# Patient Record
Sex: MALE | Race: WHITE | NOT HISPANIC OR LATINO | Employment: FULL TIME | URBAN - METROPOLITAN AREA
[De-identification: names, ages, dates, MRNs, and addresses within clinical notes are randomized per-mention and may not be internally consistent; named-entity substitution may affect disease eponyms.]

---

## 2019-07-25 ENCOUNTER — TRANSFERRED RECORDS (OUTPATIENT)
Dept: HEALTH INFORMATION MANAGEMENT | Facility: CLINIC | Age: 44
End: 2019-07-25

## 2021-04-20 ENCOUNTER — TRANSFERRED RECORDS (OUTPATIENT)
Dept: HEALTH INFORMATION MANAGEMENT | Facility: CLINIC | Age: 46
End: 2021-04-20

## 2021-06-02 ENCOUNTER — OFFICE VISIT (OUTPATIENT)
Dept: FAMILY MEDICINE | Facility: CLINIC | Age: 46
End: 2021-06-02
Payer: COMMERCIAL

## 2021-06-02 VITALS
DIASTOLIC BLOOD PRESSURE: 87 MMHG | WEIGHT: 190 LBS | SYSTOLIC BLOOD PRESSURE: 148 MMHG | OXYGEN SATURATION: 97 % | HEART RATE: 107 BPM | TEMPERATURE: 99.4 F

## 2021-06-02 DIAGNOSIS — F90.9 ATTENTION DEFICIT HYPERACTIVITY DISORDER (ADHD), UNSPECIFIED ADHD TYPE: ICD-10-CM

## 2021-06-02 DIAGNOSIS — I10 BENIGN ESSENTIAL HYPERTENSION: Primary | ICD-10-CM

## 2021-06-02 PROCEDURE — 99203 OFFICE O/P NEW LOW 30 MIN: CPT | Performed by: NURSE PRACTITIONER

## 2021-06-02 RX ORDER — DEXTROAMPHETAMINE SULFATE 15 MG/1
15 CAPSULE, EXTENDED RELEASE ORAL DAILY
COMMUNITY
End: 2021-06-02

## 2021-06-02 RX ORDER — DEXTROAMPHETAMINE SULFATE 15 MG/1
15 CAPSULE, EXTENDED RELEASE ORAL DAILY
Qty: 30 CAPSULE | Refills: 0 | Status: SHIPPED | OUTPATIENT
Start: 2021-06-02 | End: 2021-06-30

## 2021-06-02 RX ORDER — SERTRALINE HYDROCHLORIDE 100 MG/1
200 TABLET, FILM COATED ORAL DAILY
COMMUNITY
End: 2021-06-30

## 2021-06-02 RX ORDER — DEXTROAMPHETAMINE SULFATE 10 MG/1
10 CAPSULE, EXTENDED RELEASE ORAL DAILY
COMMUNITY
End: 2021-06-30

## 2021-06-02 RX ORDER — AMLODIPINE BESYLATE 10 MG/1
10 TABLET ORAL DAILY
Qty: 90 TABLET | Refills: 0 | Status: SHIPPED | OUTPATIENT
Start: 2021-06-02 | End: 2021-09-27

## 2021-06-02 SDOH — HEALTH STABILITY: MENTAL HEALTH: HOW OFTEN DO YOU HAVE 6 OR MORE DRINKS ON ONE OCCASION?: NOT ASKED

## 2021-06-02 SDOH — HEALTH STABILITY: MENTAL HEALTH: HOW MANY STANDARD DRINKS CONTAINING ALCOHOL DO YOU HAVE ON A TYPICAL DAY?: NOT ASKED

## 2021-06-02 SDOH — HEALTH STABILITY: MENTAL HEALTH: HOW OFTEN DO YOU HAVE A DRINK CONTAINING ALCOHOL?: MONTHLY OR LESS

## 2021-06-02 NOTE — PROGRESS NOTES
Assessment & Plan   Problem List Items Addressed This Visit     None      Visit Diagnoses     Benign essential hypertension    -  Primary    Relevant Medications    amLODIPine (NORVASC) 10 MG tablet    Attention deficit hyperactivity disorder (ADHD), unspecified ADHD type        Relevant Medications    dextroamphetamine (DEXEDRINE SPANSULE) 10 MG 24 hr capsule    dextroamphetamine (DEXEDRINE SPANSULE) 15 MG 24 hr capsule           Pt needing med refills as he just moved here from [place]. He had a great relationship with his medical team and had frequent check-ins. I checked  that showed no scripts.  He will est care here in the near future.    Amarilys Correa, SLAVA CNP  [facility] [place] is a 45 year old who presents for the following health issues     HPI     Chief Complaint   Patient presents with     Hypertension     pt [place] and is in need of his bp medication, amlodipine 10 mg     Just moved here from [place]   Has family in town     Has been out of BP meds for about 1 week     Was seeing a psychiatrist in [place]   Has been out of ER dextramphetamine for 1.5 months   Sees a psychologist every 2 weeks who is in [place]   Took a long time to find the right balance of meds.   Has been on meds for ADHD for about 4 years     Feels he has been stable on all of his meds   Has been on sertraline for about 1 year   Had every 3 month check in with the prescriber of his controlled substances       Review of Systems   Detailed as above         Objective    BP (!) 148/87 (BP Location: Right arm, Cuff Size: Adult Large)   Pulse 107   Temp 99.4  F (37.4  C) (Tympanic)   Wt 86.2 kg (190 lb)   SpO2 97%   There is no height or weight on file to calculate BMI.  Physical Exam   NAD  Normal mood and affect

## 2021-06-30 ENCOUNTER — OFFICE VISIT (OUTPATIENT)
Dept: FAMILY MEDICINE | Facility: CLINIC | Age: 46
End: 2021-06-30
Payer: COMMERCIAL

## 2021-06-30 VITALS
TEMPERATURE: 98.8 F | DIASTOLIC BLOOD PRESSURE: 90 MMHG | OXYGEN SATURATION: 99 % | HEART RATE: 91 BPM | WEIGHT: 193 LBS | SYSTOLIC BLOOD PRESSURE: 131 MMHG

## 2021-06-30 DIAGNOSIS — F90.9 ATTENTION DEFICIT HYPERACTIVITY DISORDER (ADHD), UNSPECIFIED ADHD TYPE: ICD-10-CM

## 2021-06-30 PROCEDURE — 99204 OFFICE O/P NEW MOD 45 MIN: CPT | Performed by: INTERNAL MEDICINE

## 2021-06-30 RX ORDER — SERTRALINE HYDROCHLORIDE 100 MG/1
200 TABLET, FILM COATED ORAL DAILY
Qty: 180 TABLET | Refills: 3 | Status: SHIPPED | OUTPATIENT
Start: 2021-06-30 | End: 2022-07-18

## 2021-06-30 RX ORDER — DEXTROAMPHETAMINE SULFATE 10 MG/1
10 CAPSULE, EXTENDED RELEASE ORAL DAILY
Status: CANCELLED | OUTPATIENT
Start: 2021-06-30

## 2021-06-30 RX ORDER — DEXTROAMPHETAMINE SULFATE 15 MG/1
15 CAPSULE, EXTENDED RELEASE ORAL DAILY
Qty: 30 CAPSULE | Refills: 0 | Status: SHIPPED | OUTPATIENT
Start: 2021-06-30

## 2021-06-30 NOTE — NURSING NOTE
Pt inquired about getting the  Dexedrine  10 mg caps refilled.   Dr. Osborne stated he does not have any documentation to support filling that.  GIANNA Release of Information faxed to psychiatrist   Dr. Eduardo Verduzco  In [facility] [phone]   Fax [phone].  Pt will inquire in a few days if  We have received and are able to prescribe.    15 mg was sent in.       dextroamphetamine (DEXEDRINE SPANSULE) 10 MG 24 hr capsule (Discontinued)    6/30/2021 --   Sig - Route: Take 10 mg by mouth daily Pt takes 40-50 mg daily - Oral   Class: Historical

## 2021-06-30 NOTE — PROGRESS NOTES
Assessment & Plan     Attention deficit hyperactivity disorder (ADHD), unspecified ADHD type  Patient states he has been on Dexedrine.  Medical record review has no evidence of stimulant prescription over the course of the last year with the exception of that prescribed on 2 June.     Additionally, the patient's past medical history dosing is different than the dosing requested today.  We need to obtain medical records for confirmation.    States that he is done well with Dexedrine.  States he has tried all other approaches.  We discussed the utilization of the psychiatrist given his medication and prescription history.  The patient states that he will attempt to obtain the future.    He continues to follow with a psychologist from the [place] area.    - sertraline (ZOLOFT) 100 MG tablet; Take 2 tablets (200 mg) by mouth daily  - dextroamphetamine (DEXEDRINE SPANSULE) 15 MG 24 hr capsule; Take 1 capsule (15 mg) by mouth daily       See Patient Instructions    Return in about 3 months (around 9/30/2021) for Follow up.    Han Osborne MD  [facility] [place] is a 45 year old who presents for the following health issues     HPI 45-year-old presents the clinic to establish care.  The patient has a history of ADHD by history.  Patient's medical records from September revealed that she was on amlodipine for essential hypertension.  He has amlodipine was refilled April 19, 2021.  History from March 2020 reveals that Wellbutrin was added for ADHD.  There was also concern at that time about the correlation between his utilization of stimulants and blood pressure.    A concrete medical history regarding this medication dosing and refills is not available at the time of this clinic visit.    Otherwise he is doing well.  Recently moved back to the [place].  He has some family in this area.    New Patient/Transfer of Care  New Patient/Transfer of Care    Review of Systems   Constitutional,  HEENT, cardiovascular, pulmonary, gi and gu systems are negative, except as otherwise noted.      Objective    BP (!) 131/90 (BP Location: Right arm, Cuff Size: Adult Large)   Pulse 91   Temp 98.8  F (37.1  C) (Tympanic)   Wt 87.5 kg (193 lb)   SpO2 99%   There is no height or weight on file to calculate BMI.  Physical Exam   GENERAL: healthy, alert and no distress  EYES: Eyes grossly normal to inspection, PERRL and conjunctivae and sclerae normal  HENT: ear canals and TM's normal, nose and mouth without ulcers or lesions  NECK: no adenopathy, no asymmetry, masses, or scars and thyroid normal to palpation  RESP: lungs clear to auscultation - no rales, rhonchi or wheezes  CV: regular rate and rhythm, normal S1 S2, no S3 or S4, no murmur, click or rub, no peripheral edema and peripheral pulses strong  ABDOMEN: soft, nontender, no hepatosplenomegaly, no masses and bowel sounds normal  MS: no gross musculoskeletal defects noted, no edema  SKIN: no suspicious lesions or rashes  NEURO: Normal strength and tone, mentation intact and speech normal  PSYCH: mentation appears normal, affect normal/bright    No results found for any previous visit.

## 2021-07-02 DIAGNOSIS — F90.9 ATTENTION DEFICIT HYPERACTIVITY DISORDER (ADHD), UNSPECIFIED ADHD TYPE: Primary | ICD-10-CM

## 2021-07-02 NOTE — TELEPHONE ENCOUNTER
Reason for Call:  Medication or medication refill:    Do you use a [facility]?  Name of the pharmacy and phone number for the current request:  [id number] - [place], [place] - [address]    Name of the medication requested: dextroamphetamine (DEXEDRINE SPANSULE) 10 MG 24 hr capsule     Other request: Pt states that medical records from Dr Eduardo Verduzco from [place], [place] (private practice) were needed before provider would prescribe. Pt is wondering if records have arrived?    Dr. Osborne - have you seen these records?    Can we leave a detailed message on this number? YES    Phone number patient can be reached at: Cell number on file:    Telephone Information:   Mobile [phone]     Best Time: any    Call taken on 7/2/2021 at 1:21 PM by Sandrine Tran

## 2021-07-05 NOTE — TELEPHONE ENCOUNTER
Left voice message for pt that rx has been filled at [facility].    Lauren Castro RN

## 2021-07-07 NOTE — TELEPHONE ENCOUNTER
This request is for his second Rx of dextroamphetamine (DEXEDRINE SPANSULE). The 15mg was sent but Pt is still waiting for his 10mg

## 2021-07-07 NOTE — TELEPHONE ENCOUNTER
Only 15 mg is currently listed on med list - is patient to be taking both 15mg and 10mg of the dextroamphetamine?     Please advise     Maame SORIA RN

## 2021-07-09 RX ORDER — DEXTROAMPHETAMINE SULFATE 10 MG/1
10 TABLET ORAL 2 TIMES DAILY PRN
Qty: 60 TABLET | Refills: 0 | Status: SHIPPED | OUTPATIENT
Start: 2021-07-09

## 2021-07-10 NOTE — TELEPHONE ENCOUNTER
Refill as ordered. I am concerned of increasing his dose given his blood pressure.    Return 3 months.    Han Osborne MD on 7/9/2021 at 9:04 PM

## 2021-09-24 DIAGNOSIS — I10 BENIGN ESSENTIAL HYPERTENSION: ICD-10-CM

## 2021-09-27 RX ORDER — AMLODIPINE BESYLATE 10 MG/1
TABLET ORAL
Qty: 90 TABLET | Refills: 0 | Status: SHIPPED | OUTPATIENT
Start: 2021-09-27

## 2021-09-27 NOTE — TELEPHONE ENCOUNTER
Routing refill request to provider for review/approval because:    Needs labs.  Appointment next week.      No results found for: EULALIO CHAHAL RN,BSN

## 2022-07-16 DIAGNOSIS — F90.9 ATTENTION DEFICIT HYPERACTIVITY DISORDER (ADHD), UNSPECIFIED ADHD TYPE: ICD-10-CM

## 2022-07-18 RX ORDER — SERTRALINE HYDROCHLORIDE 100 MG/1
TABLET, FILM COATED ORAL
Qty: 60 TABLET | Refills: 0 | Status: SHIPPED | OUTPATIENT
Start: 2022-07-18

## 2022-07-18 NOTE — TELEPHONE ENCOUNTER
Routing refill request to provider for review/approval because:  Patient needs to be seen because it has been more than 1 year since last office visit.    LOV: 6/30/21    No future appointment scheduled     Patient is not active Mychart user - TC can you please call to schedule patient for future appointment?     Noemy Mccabe RN  [facility]